# Patient Record
Sex: MALE | Race: WHITE
[De-identification: names, ages, dates, MRNs, and addresses within clinical notes are randomized per-mention and may not be internally consistent; named-entity substitution may affect disease eponyms.]

---

## 2020-09-23 ENCOUNTER — HOSPITAL ENCOUNTER (EMERGENCY)
Dept: HOSPITAL 54 - ER | Age: 35
Discharge: HOME | End: 2020-09-23
Payer: COMMERCIAL

## 2020-09-23 VITALS — BODY MASS INDEX: 27.09 KG/M2 | WEIGHT: 200 LBS | HEIGHT: 72 IN

## 2020-09-23 VITALS — SYSTOLIC BLOOD PRESSURE: 132 MMHG | DIASTOLIC BLOOD PRESSURE: 90 MMHG

## 2020-09-23 DIAGNOSIS — R07.89: Primary | ICD-10-CM

## 2020-09-23 DIAGNOSIS — R06.02: ICD-10-CM

## 2020-09-23 DIAGNOSIS — R51: ICD-10-CM

## 2020-09-23 LAB
BASOPHILS # BLD AUTO: 0 /CMM (ref 0–0.2)
BASOPHILS NFR BLD AUTO: 0.4 % (ref 0–2)
BUN SERPL-MCNC: 13 MG/DL (ref 7–18)
CALCIUM SERPL-MCNC: 9.4 MG/DL (ref 8.5–10.1)
CHLORIDE SERPL-SCNC: 102 MMOL/L (ref 98–107)
CO2 SERPL-SCNC: 26 MMOL/L (ref 21–32)
CREAT SERPL-MCNC: 0.9 MG/DL (ref 0.6–1.3)
EOSINOPHIL NFR BLD AUTO: 0.2 % (ref 0–6)
GLUCOSE SERPL-MCNC: 110 MG/DL (ref 74–106)
HCT VFR BLD AUTO: 49 % (ref 39–51)
HGB BLD-MCNC: 16.2 G/DL (ref 13.5–17.5)
LYMPHOCYTES NFR BLD AUTO: 25 % (ref 20–44)
LYMPHOCYTES NFR BLD AUTO: 3.1 /CMM (ref 0.8–4.8)
MCHC RBC AUTO-ENTMCNC: 33 G/DL (ref 31–36)
MCV RBC AUTO: 88 FL (ref 80–96)
MONOCYTES NFR BLD AUTO: 1.2 /CMM (ref 0.1–1.3)
MONOCYTES NFR BLD AUTO: 9.2 % (ref 2–12)
NEUTROPHILS # BLD AUTO: 8.1 /CMM (ref 1.8–8.9)
NEUTROPHILS NFR BLD AUTO: 65.2 % (ref 43–81)
NT-PROBNP SERPL-MCNC: 16 PG/ML (ref 0–125)
PLATELET # BLD AUTO: 289 /CMM (ref 150–450)
POTASSIUM SERPL-SCNC: 3.2 MMOL/L (ref 3.5–5.1)
RBC # BLD AUTO: 5.51 MIL/UL (ref 4.5–6)
SODIUM SERPL-SCNC: 140 MMOL/L (ref 136–145)
WBC NRBC COR # BLD AUTO: 12.5 K/UL (ref 4.3–11)

## 2020-09-23 PROCEDURE — 99285 EMERGENCY DEPT VISIT HI MDM: CPT

## 2020-09-23 PROCEDURE — 85025 COMPLETE CBC W/AUTO DIFF WBC: CPT

## 2020-09-23 PROCEDURE — 83880 ASSAY OF NATRIURETIC PEPTIDE: CPT

## 2020-09-23 PROCEDURE — 96374 THER/PROPH/DIAG INJ IV PUSH: CPT

## 2020-09-23 PROCEDURE — 71045 X-RAY EXAM CHEST 1 VIEW: CPT

## 2020-09-23 PROCEDURE — 93005 ELECTROCARDIOGRAM TRACING: CPT

## 2020-09-23 PROCEDURE — 84484 ASSAY OF TROPONIN QUANT: CPT

## 2020-09-23 PROCEDURE — 36415 COLL VENOUS BLD VENIPUNCTURE: CPT

## 2020-09-23 PROCEDURE — 80048 BASIC METABOLIC PNL TOTAL CA: CPT

## 2020-09-23 PROCEDURE — 70450 CT HEAD/BRAIN W/O DYE: CPT

## 2020-09-23 NOTE — NUR
[facility]IB [facility] FROM [facility] C/O L SIDED CHEST PAIN, NON RADIATING X 2 HRS PTA,  TO ER BED 
11, HOOKED TO MONITOR, CHANGED TO HOSP GOWN, WARM BLANKET PROVIDED, PATIENT AAO 
x 3, BREATHING EVEN AND UNLABORED. NOTED WITH STUTTERING. AWAITING MD MONGE

## 2020-09-23 NOTE — NUR
Patient given written and verbal discharge instructions. Patient verbalizes 
understanding of instructions. Patient is ambulatory with steady gait. Refuses 
offer of shelter placement. Patient given list of available shelters in 
surrounding area. Patient discharged with proper clothing. Per patient, mother 
will pick him up from waiting room. name band removed.

## 2020-09-23 NOTE — NUR
DR ALMAZAN SPEAKING TO THE MOTHER OF PATIENT. PER MOTHER, PATIENT EXPERIENCES 
HEADACHES AND STUTTERING IS SOMETHING NEW. MD WILL ORDER CT SCAN

## 2020-10-27 ENCOUNTER — HOSPITAL ENCOUNTER (EMERGENCY)
Dept: HOSPITAL 87 - ER | Age: 35
LOS: 1 days | Discharge: HOME | End: 2020-10-28
Payer: MEDICAID

## 2020-10-27 VITALS — HEIGHT: 70 IN | WEIGHT: 180.78 LBS | BODY MASS INDEX: 25.88 KG/M2

## 2020-10-27 DIAGNOSIS — R55: ICD-10-CM

## 2020-10-27 DIAGNOSIS — Z59.0: ICD-10-CM

## 2020-10-27 DIAGNOSIS — R45.851: ICD-10-CM

## 2020-10-27 DIAGNOSIS — F19.10: Primary | ICD-10-CM

## 2020-10-27 LAB
BASOPHILS NFR BLD AUTO: 0.4 % (ref 0–2)
CHLORIDE SERPL-SCNC: 116 MEQ/L (ref 98–107)
EOSINOPHIL NFR BLD AUTO: 1.5 % (ref 0–5)
ERYTHROCYTE [DISTWIDTH] IN BLOOD BY AUTOMATED COUNT: 14.1 % (ref 11.6–14.6)
HCT VFR BLD AUTO: 42 % (ref 42–52)
HGB BLD-MCNC: 14.2 G/DL (ref 14–18)
LYMPHOCYTES NFR BLD AUTO: 44.8 % (ref 20–50)
MCH RBC QN AUTO: 29.9 PG (ref 28–32)
MCV RBC AUTO: 88 FL (ref 80–94)
MONOCYTES NFR BLD AUTO: 8.3 % (ref 2–8)
NEUTROPHILS NFR BLD AUTO: 45 % (ref 40–76)
PLATELET # BLD AUTO: 237 X1000/UL (ref 130–400)
PMV BLD AUTO: 6.9 FL (ref 7.4–10.4)
RBC # BLD AUTO: 4.77 MILL/UL (ref 4.7–6.1)

## 2020-10-27 PROCEDURE — 96372 THER/PROPH/DIAG INJ SC/IM: CPT

## 2020-10-27 PROCEDURE — 80307 DRUG TEST PRSMV CHEM ANLYZR: CPT

## 2020-10-27 PROCEDURE — 80053 COMPREHEN METABOLIC PANEL: CPT

## 2020-10-27 PROCEDURE — 70450 CT HEAD/BRAIN W/O DYE: CPT

## 2020-10-27 PROCEDURE — 85025 COMPLETE CBC W/AUTO DIFF WBC: CPT

## 2020-10-27 PROCEDURE — 93005 ELECTROCARDIOGRAM TRACING: CPT

## 2020-10-27 PROCEDURE — 99285 EMERGENCY DEPT VISIT HI MDM: CPT

## 2020-10-27 PROCEDURE — 84484 ASSAY OF TROPONIN QUANT: CPT

## 2020-10-27 PROCEDURE — 83690 ASSAY OF LIPASE: CPT

## 2020-10-27 PROCEDURE — 71045 X-RAY EXAM CHEST 1 VIEW: CPT

## 2020-10-27 PROCEDURE — 36415 COLL VENOUS BLD VENIPUNCTURE: CPT

## 2020-10-27 PROCEDURE — 80329 ANALGESICS NON-OPIOID 1 OR 2: CPT

## 2020-10-27 SDOH — ECONOMIC STABILITY - HOUSING INSECURITY: HOMELESSNESS: Z59.0

## 2020-10-28 VITALS — SYSTOLIC BLOOD PRESSURE: 104 MMHG | DIASTOLIC BLOOD PRESSURE: 62 MMHG

## 2020-10-28 RX ADMIN — OLANZAPINE SCH MG: 10 TABLET, FILM COATED ORAL at 09:00

## 2020-10-28 RX ADMIN — OLANZAPINE SCH MG: 10 TABLET, FILM COATED ORAL at 08:14

## 2020-12-04 ENCOUNTER — HOSPITAL ENCOUNTER (EMERGENCY)
Dept: HOSPITAL 87 - ER | Age: 35
LOS: 4 days | Discharge: PSYCHIATRIC OR SUBSTANCE USE FACILITY | End: 2020-12-08
Payer: COMMERCIAL

## 2020-12-04 VITALS — HEIGHT: 71 IN | BODY MASS INDEX: 27.78 KG/M2 | WEIGHT: 198.42 LBS

## 2020-12-04 DIAGNOSIS — R45.851: Primary | ICD-10-CM

## 2020-12-04 DIAGNOSIS — Z88.0: ICD-10-CM

## 2020-12-04 DIAGNOSIS — F17.290: ICD-10-CM

## 2020-12-04 DIAGNOSIS — I10: ICD-10-CM

## 2020-12-04 DIAGNOSIS — R07.89: ICD-10-CM

## 2020-12-04 DIAGNOSIS — F11.10: ICD-10-CM

## 2020-12-04 LAB
AMPHETAMINES UR QL SCN: NEGATIVE
APPEARANCE UR: CLEAR
BARBITURATES UR QL SCN: NEGATIVE
BASOPHILS NFR BLD AUTO: 0.3 % (ref 0–2)
BENZODIAZ UR QL SCN: NEGATIVE
BZE UR QL SCN: NEGATIVE
CANNABINOIDS UR QL SCN: NEGATIVE
CHLORIDE SERPL-SCNC: 107 MEQ/L (ref 98–107)
COLOR UR: YELLOW
EOSINOPHIL NFR BLD AUTO: 1.5 % (ref 0–5)
ERYTHROCYTE [DISTWIDTH] IN BLOOD BY AUTOMATED COUNT: 13.1 % (ref 11.6–14.6)
ETHANOL SERPL-MCNC: < 10 MG/DL
HCT VFR BLD AUTO: 45.7 % (ref 42–52)
HGB BLD-MCNC: 16 G/DL (ref 14–18)
HGB UR QL STRIP: NEGATIVE
KETONES UR STRIP-MCNC: (no result) MG/DL
LEUKOCYTE ESTERASE UR QL STRIP: NEGATIVE
LYMPHOCYTES NFR BLD AUTO: 28 % (ref 20–50)
MCH RBC QN AUTO: 30.3 PG (ref 28–32)
MCV RBC AUTO: 87 FL (ref 80–94)
METHADONE UR QL SCN: NEGATIVE
MONOCYTES NFR BLD AUTO: 7.9 % (ref 2–8)
NEUTROPHILS NFR BLD AUTO: 62.3 % (ref 40–76)
NITRITE UR QL STRIP: NEGATIVE
OPIATES UR QL SCN: NEGATIVE
PCP UR QL SCN: NEGATIVE
PH UR STRIP: 7 [PH] (ref 4.5–8)
PLATELET # BLD AUTO: 191 X1000/UL (ref 130–400)
PMV BLD AUTO: 7 FL (ref 7.4–10.4)
PROT UR QL STRIP: NEGATIVE
RBC # BLD AUTO: 5.26 MILL/UL (ref 4.7–6.1)
SP GR UR STRIP: 1.02 (ref 1–1.03)
UROBILINOGEN UR STRIP-MCNC: 0.2 E.U./DL (ref 0.2–1)

## 2020-12-04 PROCEDURE — 99285 EMERGENCY DEPT VISIT HI MDM: CPT

## 2020-12-04 PROCEDURE — G0480 DRUG TEST DEF 1-7 CLASSES: HCPCS

## 2020-12-04 PROCEDURE — 81003 URINALYSIS AUTO W/O SCOPE: CPT

## 2020-12-04 PROCEDURE — 87426 SARSCOV CORONAVIRUS AG IA: CPT

## 2020-12-04 PROCEDURE — 80320 DRUG SCREEN QUANTALCOHOLS: CPT

## 2020-12-04 PROCEDURE — 71045 X-RAY EXAM CHEST 1 VIEW: CPT

## 2020-12-04 PROCEDURE — 80307 DRUG TEST PRSMV CHEM ANLYZR: CPT

## 2020-12-04 PROCEDURE — 96372 THER/PROPH/DIAG INJ SC/IM: CPT

## 2020-12-04 PROCEDURE — 80305 DRUG TEST PRSMV DIR OPT OBS: CPT

## 2020-12-04 PROCEDURE — 84484 ASSAY OF TROPONIN QUANT: CPT

## 2020-12-04 PROCEDURE — 93005 ELECTROCARDIOGRAM TRACING: CPT

## 2020-12-04 PROCEDURE — 36415 COLL VENOUS BLD VENIPUNCTURE: CPT

## 2020-12-04 PROCEDURE — 80329 ANALGESICS NON-OPIOID 1 OR 2: CPT

## 2020-12-04 PROCEDURE — 85025 COMPLETE CBC W/AUTO DIFF WBC: CPT

## 2020-12-04 PROCEDURE — 80053 COMPREHEN METABOLIC PANEL: CPT

## 2020-12-05 RX ADMIN — OLANZAPINE PRN MG: 10 INJECTION, POWDER, FOR SOLUTION INTRAMUSCULAR at 13:19

## 2020-12-07 RX ADMIN — OLANZAPINE SCH MG: 10 TABLET, FILM COATED ORAL at 10:00

## 2020-12-07 RX ADMIN — OLANZAPINE PRN MG: 10 INJECTION, POWDER, FOR SOLUTION INTRAMUSCULAR at 05:18

## 2020-12-08 VITALS — SYSTOLIC BLOOD PRESSURE: 114 MMHG | DIASTOLIC BLOOD PRESSURE: 76 MMHG

## 2020-12-08 RX ADMIN — OLANZAPINE SCH MG: 10 TABLET, FILM COATED ORAL at 09:12
